# Patient Record
Sex: FEMALE | Race: WHITE | URBAN - METROPOLITAN AREA
[De-identification: names, ages, dates, MRNs, and addresses within clinical notes are randomized per-mention and may not be internally consistent; named-entity substitution may affect disease eponyms.]

---

## 2018-01-29 ENCOUNTER — HISTORICAL (OUTPATIENT)
Dept: ADMINISTRATIVE | Facility: HOSPITAL | Age: 43
End: 2018-01-29

## 2022-04-10 ENCOUNTER — HISTORICAL (OUTPATIENT)
Dept: ADMINISTRATIVE | Facility: HOSPITAL | Age: 47
End: 2022-04-10

## 2022-04-27 VITALS
WEIGHT: 154.31 LBS | SYSTOLIC BLOOD PRESSURE: 135 MMHG | DIASTOLIC BLOOD PRESSURE: 80 MMHG | HEIGHT: 67 IN | OXYGEN SATURATION: 97 % | BODY MASS INDEX: 24.22 KG/M2

## 2022-05-04 NOTE — HISTORICAL [facility] CERNER
This is a historical note converted from Svetlana. Formatting and pictures may have been removed.  Please reference Svetlana for original formatting and attached multimedia. Chief Complaint  c/o tripped and fell down a drain curve. pain to right elbow down to fingers with swelling. (pain 7/10) symptoms x 2 days.  History of Present Illness  42-year-old female?tripped and fell?onto right upper extremity last night,? ?Intoxicated, unsure how she landed.? Complaining of right elbow and right wrist pain.? Has not tried anything.  Review of Systems  Constitutional: negative except as stated in HPI  Eye: negative except as stated in HPI  ENT: negative except as stated in HPI  Respiratory: negative except as stated in HPI  Cardiovascular: negative except as stated in HPI  Gastrointestinal: negative except as stated in HPI  Genitourinary: negative except as stated in HPI  Physical Exam  Vitals & Measurements  T:?37.6? ?C ?(Oral)? HR:?75?(Peripheral)? RR:?18? BP:?138/86? SpO2:?100%?  HT:?167?cm? HT:?167?cm? WT:?73.4?kg? WT:?73.4?kg? BMI:?26.32?  VITAL SIGNS: ?Reviewed. ? ?  GENERAL:?In mild distress  HEAD:? No signs of head trauma]  NECK:?No adenopathy, no JVD??  CHEST:?Clear breath sounds bilaterally. ?No wheezes  CARDIAC:?Regular rate and rhythm??  MUSCULOSKELETAL: Right upper extremity-mild swelling and tenderness?over?first and second?metacarpals?and proximal?radius.??No snuffbox tenderness.? Elbow has an effusion with mild diffuse tenderness.??No neurovascular compromise.  NEUROLOGIC EXAM:?Alert and oriented x 3. ?No focal sensory or strength deficits. ? Speech normal. ?Follows commands  ?  ?  Lab/Xray:  X-ray-hand wrist and forearm are without obvious fracture.? Scaphoid looks within normal limits.? Elbow has an anterior?fat pad?that is somewhat distinct.? No obvious fracture lines.? Has been awaiting radiology read?but?after an extended period of time will allow the patient to go home in a  sling.  ?  ?  ?  Assessment/Plan  1.?Contusion  ?Diclofenac,?compression with sling,?we will call her if radiology read?indicates fracture?and she will need to return to the emergency room.? Discussed findings?and precautions with the patient and her .  Ordered:  XR Forearm Right 2 Views, Stat, 01/29/18 19:52:00 CST, Blunt Trauma, None, Ambulatory, Rad Type, Contusion, Not Scheduled, 01/29/18 19:52:00 CST  XR Hand Right Minimum 3 Views, Stat, 01/29/18 19:52:00 CST, Blunt Trauma, None, Ambulatory, Rad Type, Contusion, Not Scheduled, 01/29/18 19:52:00 CST  XR Shoulder Right Minimum 2 Views, Stat, 01/29/18 19:52:00 CST, Blunt Trauma, None, Ambulatory, Rad Type, Contusion, Not Scheduled, 01/29/18 19:52:00 CST  XR Wrist Right Minimum 3 Views, Stat, 01/29/18 19:52:00 CST, Blunt Trauma, None, Ambulatory, Rad Type, Contusion, Not Scheduled, 01/29/18 19:52:00 CST  ?   Problem List/Past Medical History  Ongoing  Tobacco user  Historical  Medications  diclofenac sodium 75 mg oral delayed release tablet, 75 mg= 1 tab(s), Oral, BID, 1 refills  Allergies  No Known Medication Allergies  Social History  Alcohol - 01/29/2018  Current, Wine, 1-2 times per week  Substance Abuse - 01/29/2018  Current, Marijuana  Tobacco - 01/29/2018  Current every day smoker, Cigarettes, 20 per day.      No fracture per radiology

## 2024-04-12 ENCOUNTER — OFFICE VISIT (OUTPATIENT)
Dept: FAMILY MEDICINE | Facility: CLINIC | Age: 49
End: 2024-04-12
Payer: MEDICAID

## 2024-04-12 VITALS
HEIGHT: 66 IN | HEART RATE: 93 BPM | OXYGEN SATURATION: 99 % | SYSTOLIC BLOOD PRESSURE: 138 MMHG | TEMPERATURE: 98 F | WEIGHT: 156.06 LBS | DIASTOLIC BLOOD PRESSURE: 88 MMHG | BODY MASS INDEX: 25.08 KG/M2 | RESPIRATION RATE: 18 BRPM

## 2024-04-12 DIAGNOSIS — Z12.31 SCREENING MAMMOGRAM FOR BREAST CANCER: ICD-10-CM

## 2024-04-12 DIAGNOSIS — Z23 IMMUNIZATION DUE: ICD-10-CM

## 2024-04-12 DIAGNOSIS — Z11.59 NEED FOR HEPATITIS C SCREENING TEST: ICD-10-CM

## 2024-04-12 DIAGNOSIS — Z12.11 COLON CANCER SCREENING: ICD-10-CM

## 2024-04-12 DIAGNOSIS — Z11.4 SCREENING FOR HIV (HUMAN IMMUNODEFICIENCY VIRUS): ICD-10-CM

## 2024-04-12 DIAGNOSIS — Z87.891 PERSONAL HISTORY OF TOBACCO USE: ICD-10-CM

## 2024-04-12 DIAGNOSIS — Z00.00 ANNUAL PHYSICAL EXAM: ICD-10-CM

## 2024-04-12 DIAGNOSIS — Z01.419 ROUTINE GYNECOLOGICAL EXAMINATION: Primary | ICD-10-CM

## 2024-04-12 LAB
ALBUMIN SERPL-MCNC: 4.5 G/DL (ref 3.5–5)
ALBUMIN/GLOB SERPL: 1.4 RATIO (ref 1.1–2)
ALP SERPL-CCNC: 66 UNIT/L (ref 40–150)
ALT SERPL-CCNC: 40 UNIT/L (ref 0–55)
AST SERPL-CCNC: 38 UNIT/L (ref 5–34)
BASOPHILS # BLD AUTO: 0.08 X10(3)/MCL
BASOPHILS NFR BLD AUTO: 0.8 %
BILIRUB SERPL-MCNC: 0.8 MG/DL
BUN SERPL-MCNC: 12.4 MG/DL (ref 9.8–20.1)
CALCIUM SERPL-MCNC: 10.1 MG/DL (ref 8.4–10.2)
CHLORIDE SERPL-SCNC: 105 MMOL/L (ref 98–107)
CHOLEST SERPL-MCNC: 256 MG/DL
CHOLEST/HDLC SERPL: 3 {RATIO} (ref 0–5)
CO2 SERPL-SCNC: 28 MMOL/L (ref 22–29)
CREAT SERPL-MCNC: 0.76 MG/DL (ref 0.55–1.02)
EOSINOPHIL # BLD AUTO: 0.03 X10(3)/MCL (ref 0–0.9)
EOSINOPHIL NFR BLD AUTO: 0.3 %
ERYTHROCYTE [DISTWIDTH] IN BLOOD BY AUTOMATED COUNT: 11.3 % (ref 11.5–17)
GFR SERPLBLD CREATININE-BSD FMLA CKD-EPI: >60 MLS/MIN/1.73/M2
GLOBULIN SER-MCNC: 3.3 GM/DL (ref 2.4–3.5)
GLUCOSE SERPL-MCNC: 85 MG/DL (ref 74–100)
HCT VFR BLD AUTO: 43.4 % (ref 37–47)
HCV AB SERPL QL IA: NONREACTIVE
HDLC SERPL-MCNC: 98 MG/DL (ref 35–60)
HGB BLD-MCNC: 15.3 G/DL (ref 12–16)
HIV 1+2 AB+HIV1 P24 AG SERPL QL IA: NONREACTIVE
IMM GRANULOCYTES # BLD AUTO: 0.02 X10(3)/MCL (ref 0–0.04)
IMM GRANULOCYTES NFR BLD AUTO: 0.2 %
LDLC SERPL CALC-MCNC: 137 MG/DL (ref 50–140)
LYMPHOCYTES # BLD AUTO: 3.06 X10(3)/MCL (ref 0.6–4.6)
LYMPHOCYTES NFR BLD AUTO: 31.9 %
MCH RBC QN AUTO: 34.9 PG (ref 27–31)
MCHC RBC AUTO-ENTMCNC: 35.3 G/DL (ref 33–36)
MCV RBC AUTO: 98.9 FL (ref 80–94)
MONOCYTES # BLD AUTO: 0.74 X10(3)/MCL (ref 0.1–1.3)
MONOCYTES NFR BLD AUTO: 7.7 %
NEUTROPHILS # BLD AUTO: 5.65 X10(3)/MCL (ref 2.1–9.2)
NEUTROPHILS NFR BLD AUTO: 59.1 %
NRBC BLD AUTO-RTO: 0 %
PLATELET # BLD AUTO: 239 X10(3)/MCL (ref 130–400)
PMV BLD AUTO: 10.6 FL (ref 7.4–10.4)
POTASSIUM SERPL-SCNC: 4.6 MMOL/L (ref 3.5–5.1)
PROT SERPL-MCNC: 7.8 GM/DL (ref 6.4–8.3)
RBC # BLD AUTO: 4.39 X10(6)/MCL (ref 4.2–5.4)
SODIUM SERPL-SCNC: 142 MMOL/L (ref 136–145)
TRIGL SERPL-MCNC: 106 MG/DL (ref 37–140)
VLDLC SERPL CALC-MCNC: 21 MG/DL
WBC # SPEC AUTO: 9.58 X10(3)/MCL (ref 4.5–11.5)

## 2024-04-12 PROCEDURE — 99215 OFFICE O/P EST HI 40 MIN: CPT | Mod: PBBFAC | Performed by: FAMILY MEDICINE

## 2024-04-12 PROCEDURE — 90677 PCV20 VACCINE IM: CPT | Mod: PBBFAC

## 2024-04-12 PROCEDURE — 87624 HPV HI-RISK TYP POOLED RSLT: CPT | Performed by: FAMILY MEDICINE

## 2024-04-12 PROCEDURE — 80053 COMPREHEN METABOLIC PANEL: CPT | Performed by: FAMILY MEDICINE

## 2024-04-12 PROCEDURE — 88174 CYTOPATH C/V AUTO IN FLUID: CPT | Performed by: FAMILY MEDICINE

## 2024-04-12 PROCEDURE — 85025 COMPLETE CBC W/AUTO DIFF WBC: CPT | Performed by: FAMILY MEDICINE

## 2024-04-12 PROCEDURE — 80061 LIPID PANEL: CPT | Performed by: FAMILY MEDICINE

## 2024-04-12 PROCEDURE — 87624 HPV HI-RISK TYP POOLED RSLT: CPT

## 2024-04-12 PROCEDURE — 36415 COLL VENOUS BLD VENIPUNCTURE: CPT | Performed by: FAMILY MEDICINE

## 2024-04-12 PROCEDURE — 90471 IMMUNIZATION ADMIN: CPT | Mod: PBBFAC

## 2024-04-12 PROCEDURE — 90472 IMMUNIZATION ADMIN EACH ADD: CPT | Mod: PBBFAC

## 2024-04-12 PROCEDURE — 86803 HEPATITIS C AB TEST: CPT | Performed by: FAMILY MEDICINE

## 2024-04-12 PROCEDURE — 87389 HIV-1 AG W/HIV-1&-2 AB AG IA: CPT | Performed by: FAMILY MEDICINE

## 2024-04-12 PROCEDURE — 90750 HZV VACC RECOMBINANT IM: CPT | Mod: PBBFAC

## 2024-04-12 RX ORDER — BUPROPION HYDROCHLORIDE 150 MG/1
150 TABLET ORAL DAILY
Qty: 30 TABLET | Refills: 3 | Status: SHIPPED | OUTPATIENT
Start: 2024-04-12

## 2024-04-12 RX ADMIN — PNEUMOCOCCAL 20-VALENT CONJUGATE VACCINE 0.5 ML
2.2; 2.2; 2.2; 2.2; 2.2; 2.2; 2.2; 2.2; 2.2; 2.2; 2.2; 2.2; 2.2; 2.2; 2.2; 2.2; 4.4; 2.2; 2.2; 2.2 INJECTION, SUSPENSION INTRAMUSCULAR at 02:04

## 2024-04-12 RX ADMIN — Medication 0.5 ML: at 02:04

## 2024-04-12 NOTE — PROGRESS NOTES
"Subjective     Patient ID: Hortencia Salinas is a 48 y.o. female.    Chief Complaint: Establish Care    HPI    04/12/2024    Referred by a mutual friend  Has not seen  MD in yrs  Living in this area x 20 yrs now with her sig other  Works at a dental lab  No children  Menopausal - LMP 2 yrs, + hot flashes, night sweats  Ready to try to quit tobacco and update HM      ROS  Respiratory: no cough, wheezing, SOB  Cardiovascular: no CP, palpitations, edema  Gastrointestinal: no NVD, ABD pain, blood in stool, melena  Genitourinary: no dysuria, frequency, urgency, hematuria, no vag d/c/odor      PE  Vitals:    04/12/24 1304 04/12/24 1424   BP: (!) 140/88 138/88   BP Location: Left arm Left arm   Patient Position: Sitting Sitting   BP Method: Medium (Automatic) Medium (Manual)   Pulse: 93    Resp: 18    Temp: 98.4 °F (36.9 °C)    TempSrc: Oral    SpO2: 99%    Weight: 70.8 kg (156 lb 1.4 oz)    Height: 5' 6" (1.676 m)      General - NAD, comfortable  HEENT- nl TM,EAC, mau w/o redness  Neck - no node, mass ,thyromegaly  Respiratory - CTA BL, no distress  Cardiovascular - RRR, no murmur  Gastrointestinal - soft NT, no mass, nl BS x 4       :   - External genitalia: No lesions, no erythema  - Urethral meatus: No lesions, redness  - Bladder: No suprapubic tenderness  - Vaginal/pelvic support: No lesions, pale, few rugae, moist vaginal mucosa without lesions. No blood, no discharge.  - Cervix: present with no lesions, no d/c, atrophic, min bleeding on sampling  - Adnexa/parametria: No fullness or masses  - Anus/perineum: Intact without lesions or hemorrhoids  -Breasts - no mass/tend/d/c ax nodes, or skin change      Assessment  1. Routine gynecological examination    2. Personal history of tobacco use    3. Colon cancer screening    4. Screening mammogram for breast cancer    5. Annual physical exam    6. Immunization due        Plan  PAP done  Recommend Amberen OTC for hot flashes  Start Wellbutrin 150 XL once daily, r/b/SE " discussed and understood  Prevnar 20 and shingrix #1  Rec study breaking habit loops, declined out formal tobacco cessation program  Rtc 3 mos - minesh Wellbutrin and Shingrix booster    Orders Placed This Encounter    Cologuard Screening (Multitarget Stool DNA)    Mammo Digital Screening Bilat w/ Shane    CT Chest Lung Screening Low Dose    CBC Auto Differential    Comprehensive Metabolic Panel    Lipid Panel    CBC with Differential    Liquid-Based Pap Smear, Screening Screening    pneumoc 20-jose luis conj-dip cr(PF) (PREVNAR-20 (PF)) injection Syrg 0.5 mL    varicella-zoster gE-AS01B (PF)(SHINGRIX) 50 mcg/0.5 mL injection    buPROPion (WELLBUTRIN XL) 150 MG TB24 tablet

## 2024-04-17 LAB
HIGH RISK HPV: NEGATIVE
PSYCHE PATHOLOGY RESULT: NORMAL

## 2024-04-19 ENCOUNTER — TELEPHONE (OUTPATIENT)
Dept: FAMILY MEDICINE | Facility: CLINIC | Age: 49
End: 2024-04-19
Payer: MEDICAID

## 2024-04-19 NOTE — TELEPHONE ENCOUNTER
"Please inform that   labs  were all oK, sugar was normal, cholesterol was high but it is the "good chol" so she is OK, the PAP smear was nl, HIV and hep C tests were negative, all good    "

## 2024-04-26 ENCOUNTER — HOSPITAL ENCOUNTER (OUTPATIENT)
Dept: RADIOLOGY | Facility: HOSPITAL | Age: 49
Discharge: HOME | End: 2024-04-26
Attending: FAMILY MEDICINE
Payer: MEDICAID

## 2024-04-26 DIAGNOSIS — Z87.891 PERSONAL HISTORY OF TOBACCO USE: ICD-10-CM

## 2024-04-26 DIAGNOSIS — Z12.31 SCREENING MAMMOGRAM FOR BREAST CANCER: ICD-10-CM

## 2024-04-26 PROCEDURE — 77063 BREAST TOMOSYNTHESIS BI: CPT | Mod: TC

## 2024-04-26 PROCEDURE — 77067 SCR MAMMO BI INCL CAD: CPT | Mod: 26,,, | Performed by: RADIOLOGY

## 2024-04-26 PROCEDURE — 71271 CT THORAX LUNG CANCER SCR C-: CPT | Mod: TC

## 2024-04-26 PROCEDURE — 77063 BREAST TOMOSYNTHESIS BI: CPT | Mod: 26,,, | Performed by: RADIOLOGY

## 2024-05-19 LAB — NONINV COLON CA DNA+OCC BLD SCRN STL QL: NEGATIVE

## 2024-05-20 ENCOUNTER — TELEPHONE (OUTPATIENT)
Dept: FAMILY MEDICINE | Facility: CLINIC | Age: 49
End: 2024-05-20
Payer: MEDICAID

## 2024-07-12 ENCOUNTER — OFFICE VISIT (OUTPATIENT)
Dept: FAMILY MEDICINE | Facility: CLINIC | Age: 49
End: 2024-07-12
Payer: MEDICAID

## 2024-07-12 VITALS
HEIGHT: 66 IN | BODY MASS INDEX: 25.37 KG/M2 | HEART RATE: 88 BPM | RESPIRATION RATE: 18 BRPM | TEMPERATURE: 99 F | SYSTOLIC BLOOD PRESSURE: 127 MMHG | OXYGEN SATURATION: 98 % | WEIGHT: 157.88 LBS | DIASTOLIC BLOOD PRESSURE: 81 MMHG

## 2024-07-12 DIAGNOSIS — Z71.6 ENCOUNTER FOR TOBACCO USE CESSATION COUNSELING: ICD-10-CM

## 2024-07-12 DIAGNOSIS — K04.7 DENTAL ABSCESS: ICD-10-CM

## 2024-07-12 DIAGNOSIS — Z23 IMMUNIZATION DUE: ICD-10-CM

## 2024-07-12 DIAGNOSIS — R61 NIGHT SWEATS: Primary | ICD-10-CM

## 2024-07-12 PROCEDURE — 99213 OFFICE O/P EST LOW 20 MIN: CPT | Mod: PBBFAC | Performed by: FAMILY MEDICINE

## 2024-07-12 RX ORDER — BUPROPION HYDROCHLORIDE 300 MG/1
300 TABLET ORAL DAILY
Qty: 30 TABLET | Refills: 5 | Status: SHIPPED | OUTPATIENT
Start: 2024-07-12

## 2024-07-12 RX ORDER — NORETHINDRONE ACETATE AND ETHINYL ESTRADIOL .5; 2.5 MG/1; UG/1
1 TABLET ORAL DAILY
Qty: 30 TABLET | Refills: 5 | Status: SHIPPED | OUTPATIENT
Start: 2024-07-12

## 2024-07-12 RX ORDER — AMOXICILLIN AND CLAVULANATE POTASSIUM 875; 125 MG/1; MG/1
1 TABLET, FILM COATED ORAL 2 TIMES DAILY
Qty: 20 TABLET | Refills: 0 | Status: SHIPPED | OUTPATIENT
Start: 2024-07-12

## 2024-07-12 NOTE — PROGRESS NOTES
"Subjective     Patient ID: Hortencia Salinas is a 49 y.o. female.    Chief Complaint: Follow-up, Nicotine Dependence, and Immunizations (Second Shingrix need)    HPI    07/12/2024    Here for FU    1. Night sweats  Hot flashes night sweats for many months, LMP  about 14 -15 mos, no spotting, no ho breast cancer, blood clot personal or FH, wakes 10-15 times a night soaking wet      2. Dental abscess  Flared over a week ago, has gone down now, but knows it will come back, has had this before      3. Encounter for tobacco use cessation counseling  Has been melania to cut back about 30% withy Wellbutrin, it actually helped with her frequent BM - less often now, when she dose smoke, she cuts it in 1/2 a lot                      Current Outpatient Medications   Medication Instructions    buPROPion (WELLBUTRIN XL) 150 mg, Oral, Daily         ROS  Respiratory: no cough, wheezing, SOB  Cardiovascular: no CP, palpitations, edema  Gastrointestinal: no NVD,  blood in stool, melena        PE  Vitals:    07/12/24 1304   BP: 127/81   BP Location: Left arm   Patient Position: Sitting   BP Method: Medium (Automatic)   Pulse: 88   Resp: 18   Temp: 99.3 °F (37.4 °C)   TempSrc: Oral   SpO2: 98%   Weight: 71.6 kg (157 lb 13.6 oz)   Height: 5' 6" (1.676 m)     General - NAD, comfortable  HEENT- nl TM,EAC, mau w/o redness, some gum disease, mild redness, swelling left upper gingiva  Neck - no node, mass ,thyromegaly  Respiratory - CTA BL, no distress  Cardiovascular - RRR, no murmur,edema      Assessment  1. Night sweats /Menopausal s/s   2. Dental abscess    3. Encounter for tobacco use cessation counseling    4. Immunization due          Plan  Increase Wellbutrin  to 300mg once daily  Start  low dose HRT once daily, R/B/SE/ contraindications discussed and understood  Augmentin for dental abscess  Sliding scale dental resources discussed  Shingrix#2  Rtc 4 mos, sooner prn      Orders Placed This Encounter    amoxicillin-clavulanate " 875-125mg (AUGMENTIN) 875-125 mg per tablet    norethindrone ac-eth estradioL (FYAVOLV) 0.5-2.5 mg-mcg per tablet    buPROPion (WELLBUTRIN XL) 300 MG 24 hr tablet    varicella-zoster GE-AS01B (PF)(SHINGRIX) 50 mcg/0.5 mL KIT

## 2024-08-11 RX ORDER — NORETHINDRONE ACETATE AND ETHINYL ESTRADIOL .5; 2.5 MG/1; UG/1
1 TABLET ORAL DAILY
Qty: 30 TABLET | Refills: 5 | Status: CANCELLED | OUTPATIENT
Start: 2024-08-11

## 2024-08-12 ENCOUNTER — PATIENT MESSAGE (OUTPATIENT)
Dept: FAMILY MEDICINE | Facility: CLINIC | Age: 49
End: 2024-08-12
Payer: MEDICAID

## 2024-08-12 RX ORDER — BUPROPION HYDROCHLORIDE 300 MG/1
300 TABLET ORAL DAILY
Qty: 30 TABLET | Refills: 5 | Status: SHIPPED | OUTPATIENT
Start: 2024-08-12

## 2024-08-12 RX ORDER — CONJUGATED ESTROGENS AND MEDROXYPROGESTERONE ACETATE .45; 1.5 MG/1; MG/1
1 TABLET, SUGAR COATED ORAL DAILY
Qty: 30 TABLET | Refills: 5 | Status: SHIPPED | OUTPATIENT
Start: 2024-08-12

## 2024-11-15 ENCOUNTER — OFFICE VISIT (OUTPATIENT)
Dept: FAMILY MEDICINE | Facility: CLINIC | Age: 49
End: 2024-11-15
Payer: MEDICAID

## 2024-11-15 VITALS
HEART RATE: 88 BPM | HEIGHT: 66 IN | BODY MASS INDEX: 24.37 KG/M2 | RESPIRATION RATE: 16 BRPM | DIASTOLIC BLOOD PRESSURE: 82 MMHG | WEIGHT: 151.63 LBS | SYSTOLIC BLOOD PRESSURE: 138 MMHG | OXYGEN SATURATION: 99 % | TEMPERATURE: 98 F

## 2024-11-15 DIAGNOSIS — J06.9 UPPER RESPIRATORY TRACT INFECTION, UNSPECIFIED TYPE: ICD-10-CM

## 2024-11-15 DIAGNOSIS — R61 NIGHT SWEATS: Primary | ICD-10-CM

## 2024-11-15 DIAGNOSIS — Z23 IMMUNIZATION DUE: ICD-10-CM

## 2024-11-15 DIAGNOSIS — Z72.0 TOBACCO USE: ICD-10-CM

## 2024-11-15 PROCEDURE — 99213 OFFICE O/P EST LOW 20 MIN: CPT | Mod: PBBFAC | Performed by: FAMILY MEDICINE

## 2024-11-15 PROCEDURE — 90471 IMMUNIZATION ADMIN: CPT | Mod: PBBFAC

## 2024-11-15 PROCEDURE — 90656 IIV3 VACC NO PRSV 0.5 ML IM: CPT | Mod: PBBFAC

## 2024-11-15 RX ORDER — ESTRADIOL AND NORETHINDRONE ACETATE 1; .5 MG/1; MG/1
1 TABLET ORAL DAILY
Qty: 30 TABLET | Refills: 5 | Status: SHIPPED | OUTPATIENT
Start: 2024-11-15

## 2024-11-15 RX ADMIN — INFLUENZA VIRUS VACCINE 0.5 ML: 15; 15; 15 SUSPENSION INTRAMUSCULAR at 10:11

## 2024-11-15 NOTE — PROGRESS NOTES
"Subjective     Patient ID: Hortencia Salinas is a 49 y.o. female.    Chief Complaint: Follow-up (Right foot pain)    HPI    11/15/2024    Night sweats-  improved with current prempro, strong clinical benefit, mood also improved and overall feels better, does still have significant s/s and would like more relief if possible, Mammol - nl, no vag bleeding, had been without menses for just over a year when started HRT    Tobacco use - tried to cut down but it has been hard, still smoking but not the full cigarettes, Wellbutrin did not really help but has helped overall with mood and how she feels,  declines structured program but aware of an appt that a  friend used so she will try that    C/o  -right pain , mild to mod, due to longstanding bunion, recently got shoes with wider toe box and this helps a lot, not interested in surgery  at this time  - improving URI s/s, cough -will monitor, has Augmentin if needed        Current Outpatient Medications   Medication Instructions    amoxicillin-clavulanate 875-125mg (AUGMENTIN) 875-125 mg per tablet 1 tablet, Oral, 2 times daily    buPROPion (WELLBUTRIN XL) 300 mg, Oral, Daily    estrogen, conjugated,-medroxyprogesterone (PREMPRO) 0.45-1.5 mg per tablet 1 tablet, Oral, Daily         ROS  Constitutional: no fever, fatigue, weakness  ENT: no sore throat, ear pain,  -+ nasal congestion/drainage-  clear  Respiratory: no wheezing, SOB, cough- white/clear mucous  Cardiovascular: no CP, palpitations, edema  Gastrointestinal: no NVD, ABD pain, blood in stool, melena      PE  Vitals:    11/15/24 0959   BP: (!) 143/83   BP Location: Left arm   Patient Position: Sitting   Pulse: 88   Resp: 16   Temp: 98 °F (36.7 °C)   TempSrc: Oral   SpO2: 99%   Weight: 68.8 kg (151 lb 9.6 oz)   Height: 5' 5.98" (1.676 m)     General - NAD, comfortable  HEENT- nl TM,EAC, mau w/o redness, no sinus tend, PND  Neck - no adenopathy, mass ,thyromegaly  Respiratory - CTA BL, no distress  Cardiovascular - RRR, no " murmur  Gastrointestinal - soft NT, no mass, nl BS x 4     Prom bunion right great toe, good flex/ext at MTP, nl pulse      Assessment  1. Night sweats    2. Tobacco use    3. Upper respiratory tract infection, unspecified type    4. Immunization due          Plan  Continue current meds with any changes listed below  Stop Prempro  Start Activella  Cont Wellburtin XL 300mg  Will try an nela to help with smoking  Continue sudafed, consider mucinex  Flu shot  Low salt diet  RTC 6 mos    Orders Placed This Encounter    influenza (Flulaval, Fluzone, Fluarix) 45 mcg/0.5 mL IM vaccine (> or = 6 mo) 0.5 mL    estradiol-norethindrone (ACTIVELLA) 1-0.5 mg per tablet

## 2025-01-10 ENCOUNTER — PATIENT MESSAGE (OUTPATIENT)
Dept: FAMILY MEDICINE | Facility: CLINIC | Age: 50
End: 2025-01-10
Payer: MEDICAID

## 2025-01-10 RX ORDER — BUPROPION HYDROCHLORIDE 300 MG/1
300 TABLET ORAL DAILY
Qty: 30 TABLET | Refills: 5 | Status: SHIPPED | OUTPATIENT
Start: 2025-01-10

## 2025-01-10 RX ORDER — ESTRADIOL AND NORETHINDRONE ACETATE 1; .5 MG/1; MG/1
1 TABLET ORAL DAILY
Qty: 30 TABLET | Refills: 5 | OUTPATIENT
Start: 2025-01-10

## 2025-01-29 ENCOUNTER — PATIENT MESSAGE (OUTPATIENT)
Dept: FAMILY MEDICINE | Facility: CLINIC | Age: 50
End: 2025-01-29
Payer: MEDICAID

## 2025-05-05 RX ORDER — ESTRADIOL AND NORETHINDRONE ACETATE 1; .5 MG/1; MG/1
1 TABLET, FILM COATED ORAL
Qty: 28 TABLET | Refills: 6 | Status: SHIPPED | OUTPATIENT
Start: 2025-05-05

## 2025-05-09 ENCOUNTER — OFFICE VISIT (OUTPATIENT)
Dept: FAMILY MEDICINE | Facility: CLINIC | Age: 50
End: 2025-05-09
Payer: MEDICAID

## 2025-05-09 VITALS
HEART RATE: 89 BPM | DIASTOLIC BLOOD PRESSURE: 84 MMHG | TEMPERATURE: 100 F | BODY MASS INDEX: 24.91 KG/M2 | WEIGHT: 155 LBS | HEIGHT: 66 IN | SYSTOLIC BLOOD PRESSURE: 138 MMHG | OXYGEN SATURATION: 98 %

## 2025-05-09 DIAGNOSIS — Z00.00 ANNUAL PHYSICAL EXAM: ICD-10-CM

## 2025-05-09 DIAGNOSIS — Z87.891 PERSONAL HISTORY OF TOBACCO USE: ICD-10-CM

## 2025-05-09 DIAGNOSIS — Z12.31 SCREENING MAMMOGRAM FOR BREAST CANCER: ICD-10-CM

## 2025-05-09 DIAGNOSIS — R61 NIGHT SWEATS: Primary | ICD-10-CM

## 2025-05-09 LAB
ANION GAP SERPL CALC-SCNC: 8 MEQ/L
BUN SERPL-MCNC: 11.2 MG/DL (ref 9.8–20.1)
CALCIUM SERPL-MCNC: 8.6 MG/DL (ref 8.4–10.2)
CHLORIDE SERPL-SCNC: 111 MMOL/L (ref 98–107)
CHOLEST SERPL-MCNC: 204 MG/DL
CHOLEST/HDLC SERPL: 3 {RATIO} (ref 0–5)
CO2 SERPL-SCNC: 22 MMOL/L (ref 22–29)
CREAT SERPL-MCNC: 0.73 MG/DL (ref 0.55–1.02)
CREAT/UREA NIT SERPL: 15
GFR SERPLBLD CREATININE-BSD FMLA CKD-EPI: >60 ML/MIN/1.73/M2
GLUCOSE SERPL-MCNC: 77 MG/DL (ref 74–100)
HDLC SERPL-MCNC: 61 MG/DL (ref 35–60)
LDLC SERPL CALC-MCNC: 77 MG/DL (ref 50–140)
POTASSIUM SERPL-SCNC: 4.5 MMOL/L (ref 3.5–5.1)
SODIUM SERPL-SCNC: 141 MMOL/L (ref 136–145)
TRIGL SERPL-MCNC: 332 MG/DL (ref 37–140)
VLDLC SERPL CALC-MCNC: 66 MG/DL

## 2025-05-09 PROCEDURE — 80061 LIPID PANEL: CPT

## 2025-05-09 PROCEDURE — 80048 BASIC METABOLIC PNL TOTAL CA: CPT

## 2025-05-09 PROCEDURE — 99214 OFFICE O/P EST MOD 30 MIN: CPT | Mod: PBBFAC | Performed by: FAMILY MEDICINE

## 2025-05-09 RX ORDER — BUPROPION HYDROCHLORIDE 300 MG/1
300 TABLET ORAL DAILY
Qty: 30 TABLET | Refills: 5 | Status: SHIPPED | OUTPATIENT
Start: 2025-05-09

## 2025-05-10 NOTE — PROGRESS NOTES
"Subjective     Patient ID: Hortencia Salinas is a 49 y.o. female.    Chief Complaint: Follow-up (Night sweats) and Night Sweats (Here today to f/u for last tx of night sweats, denies any new complaints, she states she has improved)    HPI    05/09/2025  No c/o    Night sweats/Menopausal/HRT - about 2-3 yrs in now, HRT with great results, s/s seem to have lessened overall, understands risks of clots with HRT and risk higher with smoking    Tobacco use - has but back  to 1 during the work day but still smokes up to 10 in the evening when she gets home, discussed strategies for minimizing use. Complete abstinence preferable but not her goal at this time, she is unsure if Wellbutrin is helping with anything, will consider lowering dose if we can get her PM use down some        HM  Mammo: 4/2024  PAP: 4/2024  CRC: 5/2024  LDCT: 4/2024    Current Outpatient Medications   Medication Instructions    buPROPion (WELLBUTRIN XL) 300 mg, Oral, Daily    estradiol-norethindrone (MIMVEY) 1-0.5 mg per tablet 1 tablet, Oral         ROS  Respiratory: no cough, wheezing, SOB  Cardiovascular: no CP, palpitations, edema  Gastrointestinal: no NVD, ABD pain, blood in stool, melena      PE  Vitals:    05/09/25 0951   BP: 138/84   BP Location: Left arm   Patient Position: Sitting   Pulse: 89   Temp: 99.6 °F (37.6 °C)   TempSrc: Oral   SpO2: 98%   Weight: 70.3 kg (155 lb)   Height: 5' 5.98" (1.676 m)     General - NAD, comfortable  HEENT- nl TM,EAC, mau w/o redness, no oral cavity lesions  Neck - no adenopathy, mass ,thyromegaly  Respiratory - CTA BL, no distress  Cardiovascular - RRR, no murmur  Gastrointestinal - soft NT, no mass, nl BS x 4       Assessment  1. Night sweats    2. Personal history of tobacco use    3. Annual physical exam        Plan  Continue current meds with any changes listed below  Try reducing PM tobacco use to 1 per taking hour -  5pm, 6 pm, etc  BMP  Mammo  LDCT  Rtc 6 mos    Orders Placed This Encounter    Mammo Digital " Screening Bilat w/ Shane (XPD)    CT Chest Lung Screening Low Dose    Basic Metabolic Panel    Lipid Panel    buPROPion (WELLBUTRIN XL) 300 MG 24 hr tablet

## 2025-05-30 ENCOUNTER — HOSPITAL ENCOUNTER (OUTPATIENT)
Dept: RADIOLOGY | Facility: HOSPITAL | Age: 50
Discharge: HOME | End: 2025-05-30
Attending: FAMILY MEDICINE
Payer: MEDICAID

## 2025-05-30 ENCOUNTER — RESULTS FOLLOW-UP (OUTPATIENT)
Dept: FAMILY MEDICINE | Facility: CLINIC | Age: 50
End: 2025-05-30

## 2025-05-30 DIAGNOSIS — Z87.891 PERSONAL HISTORY OF TOBACCO USE: ICD-10-CM

## 2025-05-30 DIAGNOSIS — Z12.31 SCREENING MAMMOGRAM FOR BREAST CANCER: ICD-10-CM

## 2025-05-30 PROCEDURE — 77067 SCR MAMMO BI INCL CAD: CPT | Mod: 26,,, | Performed by: RADIOLOGY

## 2025-05-30 PROCEDURE — 71271 CT THORAX LUNG CANCER SCR C-: CPT | Mod: TC

## 2025-05-30 PROCEDURE — 77067 SCR MAMMO BI INCL CAD: CPT | Mod: TC

## 2025-05-30 PROCEDURE — 77063 BREAST TOMOSYNTHESIS BI: CPT | Mod: 26,,, | Performed by: RADIOLOGY

## 2025-06-02 ENCOUNTER — TELEPHONE (OUTPATIENT)
Dept: FAMILY MEDICINE | Facility: CLINIC | Age: 50
End: 2025-06-02
Payer: MEDICAID

## 2025-06-04 ENCOUNTER — HOSPITAL ENCOUNTER (OUTPATIENT)
Dept: RADIOLOGY | Facility: HOSPITAL | Age: 50
Discharge: HOME | End: 2025-06-04
Attending: FAMILY MEDICINE
Payer: MEDICAID

## 2025-06-04 DIAGNOSIS — R92.8 ABNORMAL MAMMOGRAM: ICD-10-CM

## 2025-06-04 PROCEDURE — 76641 ULTRASOUND BREAST COMPLETE: CPT | Mod: TC,LT

## 2025-06-04 PROCEDURE — 77065 DX MAMMO INCL CAD UNI: CPT | Mod: TC,LT
